# Patient Record
Sex: MALE | Race: WHITE | Employment: FULL TIME | URBAN - METROPOLITAN AREA
[De-identification: names, ages, dates, MRNs, and addresses within clinical notes are randomized per-mention and may not be internally consistent; named-entity substitution may affect disease eponyms.]

---

## 2021-06-04 ENCOUNTER — ANESTHESIA EVENT (OUTPATIENT)
Dept: SURGERY | Age: 61
End: 2021-06-04
Payer: COMMERCIAL

## 2021-06-04 RX ORDER — HYDROCHLOROTHIAZIDE 25 MG/1
25 TABLET ORAL DAILY
COMMUNITY

## 2021-06-04 RX ORDER — LANOLIN ALCOHOL/MO/W.PET/CERES
1000 CREAM (GRAM) TOPICAL DAILY
COMMUNITY

## 2021-06-04 RX ORDER — ACETAMINOPHEN 160 MG
1 TABLET,DISINTEGRATING ORAL DAILY
COMMUNITY

## 2021-06-04 RX ORDER — HUMAN IMMUNOGLOBULIN G 0.2 G/ML
LIQUID SUBCUTANEOUS WEEKLY
COMMUNITY

## 2021-06-04 RX ORDER — BUDESONIDE 3 MG/1
6 CAPSULE, COATED PELLETS ORAL EVERY MORNING
COMMUNITY

## 2021-06-04 RX ORDER — MONTELUKAST SODIUM 10 MG/1
10 TABLET ORAL NIGHTLY
COMMUNITY

## 2021-06-04 RX ORDER — AMLODIPINE BESYLATE 10 MG/1
10 TABLET ORAL DAILY
COMMUNITY

## 2021-06-04 RX ORDER — ASPIRIN 81 MG/1
81 TABLET ORAL DAILY
COMMUNITY

## 2021-06-04 RX ORDER — ATORVASTATIN CALCIUM 40 MG/1
40 TABLET, FILM COATED ORAL DAILY
COMMUNITY

## 2021-06-04 NOTE — PROGRESS NOTES
Preoperative Screening for Elective Surgery/Invasive Procedures While COVID-19 present in the community     Have you tested positive or have been told to self-isolate for COVID-19 like symptoms within the past 28 days? no   Do you currently have any of the following symptoms? no  o Fever >100.0 F or 99.9 F in immunocompromised patients? no  o New onset cough, shortness of breath or difficulty breathing? no  o New onset sore throat, myalgia (muscle aches and pains), headache, loss of taste/smell or diarrhea? no   Have you had a potential exposure to COVID-19 within the past 14 days by:  o Close contact with a confirmed case? no  o Close contact with a healthcare worker,  or essential infrastructure worker (grocery store, [employer], gas station, public utilities or transportation)? Yes md offices   o Do you reside in a congregate setting such as; skilled nursing facility, adult home, correctional facility, homeless shelter or other institutional setting? no  Have you had recent travel to a known COVID-19 hotspot? no  Indicate if the patient has a positive screen by answering yes to one or more of the above questions. Patients who test positive or screen positive prior to surgery or on the day of surgery should be evaluated in conjunction with the surgeon/proceduralist/anesthesiologist to determine the urgency of the procedure. Received COVID 19 vaccine over 14 days ago.

## 2021-06-04 NOTE — PROGRESS NOTES
[address] time___0905_________        Surgery time___1035________    Take the following medications with a sip of water:  Per Md instructions     Do not eat or drink anything after 12:00 midnight prior to your surgery. This includes water chewing gum, mints and ice chips. You may brush your teeth and gargle the morning of your surgery, but do not swallow the water     Please see your family doctor/pediatrician for a history and physical and/or concerning medications. H&P 5/21/21 YVROSE Harry    Bring any test results/reports from your physicians office. If you are under the care of a heart doctor or specialist doctor, please be aware that you may be asked to them for clearance    You may be asked to stop blood thinners such as Coumadin, Plavix, Fragmin, Lovenox, etc., or any anti-inflammatories such as:  Aspirin, Ibuprofen, Advil, Naproxen prior to your surgery. We also ask that you stop any OTC medications such as fish oil, vitamin E, glucosamine, garlic, Multivitamins, COQ 10, etc.    We ask that you do not smoke 24 hours prior to surgery  We ask that you do not  drink any alcoholic beverages 24 hours prior to surgery     You must make arrangements for a responsible adult to take you home after your surgery. For your safety you will not be allowed to leave alone or drive yourself home. Your surgery will be cancelled if you do not have a ride home. Also for your safety, it is strongly suggested that someone stay with you the first 24 hours after your surgery. A parent or legal guardian must accompany a child scheduled for surgery and plan to stay at the hospital until the child is discharged. Please do not bring other children with you. For your comfort, please wear simple loose fitting clothing to the hospital.  Please do not bring valuables. Do not wear any make-up or nail polish on your fingers or toes.  Wear short sleeve button down shirt or loose fitting shirt. Bring eye drops or antibiotic ointment. For your safety, please do not wear any jewelry or body piercing's on the day of surgery. All jewelry must be removed. If you have dentures, they will be removed before going to operating room. For your convenience, we will provide you with a container. If you wear contact lenses or glasses, they will be removed, please bring a case for them. If you have a living will and a durable power of  for healthcare, please bring in a copy. As part of our patient safety program to minimize surgical site infections, we ask you to do the following:    · Please notify your surgeon if you develop any illness between         now and the  day of your surgery. · This includes a cough, cold, fever, sore throat, nausea,         or vomiting, and diarrhea, etc.  ·  Please notify your surgeon if you experience dizziness, shortness         of breath or blurred vision between now and the time of your surgery. Do not shave your operative site 96 hours prior to surgery. For face and neck surgery, men may use an electric razor 48 hours   prior to surgery. You may shower the night before surgery or the morning of   your surgery with an antibacterial soap. You will need to bring a photo ID and insurance card    [facility] has an onsite pharmacy, would you like to utilize our pharmacy     If you will be staying overnight and use a C-pap machine, please bring   your C-pap to hospital     Our goal is to provide you with excellent care, therefore, visitors will be limited to two(2) in the room at a time so that we may focus on providing this care for you. Please contact pre-admission testing if you have any further questions.                  [facility] phone number:  [phone]    Please note these are generalized instructions for all surgical cases, you may be provided with more specific instructions according to your

## 2021-06-04 NOTE — PROGRESS NOTES
[address]        Pre-Op Phone Call:     Patient Name: La Guzman     Telephone Information:   Mobile [phone]     Home phone:  [phone]    Surgery Time:   10:30 AM     Arrival Time:  0900     Left extended Message:  NA     Message left with:     Recent change in health status:  No     Advised of transportation/ policy:  Yes     NPO policy reviewed:  Yes pt     Advised to take morning heart/blood pressure medications with sips of water morning of surgery? Yes     Instructed to bring eye drops, photo identification, and insurance card day of surgery? Yes     Advised to wear short sleeved button down shirt (no T-shirt underneath):  Yes     Advised not to wear jewelry, hairpins, or pantyhose day of surgery? Yes     Advised not to wear make-up and to wash face day of surgery?   Yes    Remarks:        Electronically signed by:  Libia Sexton RN at 6/4/2021 1:38 PM

## 2021-06-07 ENCOUNTER — HOSPITAL ENCOUNTER (OUTPATIENT)
Age: 61
Setting detail: OUTPATIENT SURGERY
Discharge: HOME | End: 2021-06-07
Attending: OPHTHALMOLOGY | Admitting: OPHTHALMOLOGY
Payer: COMMERCIAL

## 2021-06-07 ENCOUNTER — ANESTHESIA (OUTPATIENT)
Dept: SURGERY | Age: 61
End: 2021-06-07
Payer: COMMERCIAL

## 2021-06-07 VITALS
DIASTOLIC BLOOD PRESSURE: 87 MMHG | HEIGHT: 72 IN | HEART RATE: 79 BPM | RESPIRATION RATE: 16 BRPM | WEIGHT: 244 LBS | OXYGEN SATURATION: 97 % | SYSTOLIC BLOOD PRESSURE: 136 MMHG | TEMPERATURE: 97.8 F | BODY MASS INDEX: 33.05 KG/M2

## 2021-06-07 VITALS
RESPIRATION RATE: 20 BRPM | DIASTOLIC BLOOD PRESSURE: 79 MMHG | OXYGEN SATURATION: 98 % | SYSTOLIC BLOOD PRESSURE: 127 MMHG

## 2021-06-07 DIAGNOSIS — H02.011 CICATRICIAL ENTROPION OF RIGHT UPPER EYELID: ICD-10-CM

## 2021-06-07 DIAGNOSIS — H10.89 CONJUNCTIVITIS IN MUCOCUTANEOUS DISEASE: ICD-10-CM

## 2021-06-07 PROCEDURE — 2580000003 HC RX 258: Performed by: ANESTHESIOLOGY

## 2021-06-07 PROCEDURE — 3600000012 HC SURGERY LEVEL 2 ADDTL 15MIN: Performed by: OPHTHALMOLOGY

## 2021-06-07 PROCEDURE — 7100000011 HC PHASE II RECOVERY - ADDTL 15 MIN: Performed by: OPHTHALMOLOGY

## 2021-06-07 PROCEDURE — 6370000000 HC RX 637 (ALT 250 FOR IP): Performed by: OPHTHALMOLOGY

## 2021-06-07 PROCEDURE — 3600000002 HC SURGERY LEVEL 2 BASE: Performed by: OPHTHALMOLOGY

## 2021-06-07 PROCEDURE — 2500000003 HC RX 250 WO HCPCS: Performed by: OPHTHALMOLOGY

## 2021-06-07 PROCEDURE — 2709999900 HC NON-CHARGEABLE SUPPLY: Performed by: OPHTHALMOLOGY

## 2021-06-07 PROCEDURE — V2790 AMNIOTIC MEMBRANE: HCPCS | Performed by: OPHTHALMOLOGY

## 2021-06-07 PROCEDURE — 3700000000 HC ANESTHESIA ATTENDED CARE: Performed by: OPHTHALMOLOGY

## 2021-06-07 PROCEDURE — 7100000010 HC PHASE II RECOVERY - FIRST 15 MIN: Performed by: OPHTHALMOLOGY

## 2021-06-07 PROCEDURE — 6360000002 HC RX W HCPCS: Performed by: NURSE ANESTHETIST, CERTIFIED REGISTERED

## 2021-06-07 PROCEDURE — 3700000001 HC ADD 15 MINUTES (ANESTHESIA): Performed by: OPHTHALMOLOGY

## 2021-06-07 PROCEDURE — 88300 SURGICAL PATH GROSS: CPT

## 2021-06-07 DEVICE — IMPLANTABLE DEVICE: Type: IMPLANTABLE DEVICE | Site: EYE | Status: FUNCTIONAL

## 2021-06-07 RX ORDER — SODIUM CHLORIDE 0.9 % (FLUSH) 0.9 %
10 SYRINGE (ML) INJECTION EVERY 12 HOURS SCHEDULED
Status: DISCONTINUED | OUTPATIENT
Start: 2021-06-07 | End: 2021-06-07 | Stop reason: HOSPADM

## 2021-06-07 RX ORDER — FENTANYL CITRATE 50 UG/ML
INJECTION, SOLUTION INTRAMUSCULAR; INTRAVENOUS PRN
Status: DISCONTINUED | OUTPATIENT
Start: 2021-06-07 | End: 2021-06-07 | Stop reason: SDUPTHER

## 2021-06-07 RX ORDER — PROPOFOL 10 MG/ML
INJECTION, EMULSION INTRAVENOUS PRN
Status: DISCONTINUED | OUTPATIENT
Start: 2021-06-07 | End: 2021-06-07 | Stop reason: SDUPTHER

## 2021-06-07 RX ORDER — SODIUM CHLORIDE 9 MG/ML
INJECTION, SOLUTION INTRAVENOUS CONTINUOUS
Status: DISCONTINUED | OUTPATIENT
Start: 2021-06-07 | End: 2021-06-07 | Stop reason: HOSPADM

## 2021-06-07 RX ORDER — LABETALOL HYDROCHLORIDE 5 MG/ML
5 INJECTION, SOLUTION INTRAVENOUS EVERY 10 MIN PRN
Status: DISCONTINUED | OUTPATIENT
Start: 2021-06-07 | End: 2021-06-07 | Stop reason: HOSPADM

## 2021-06-07 RX ORDER — ONDANSETRON 2 MG/ML
4 INJECTION INTRAMUSCULAR; INTRAVENOUS
Status: DISCONTINUED | OUTPATIENT
Start: 2021-06-07 | End: 2021-06-07 | Stop reason: HOSPADM

## 2021-06-07 RX ORDER — SODIUM CHLORIDE 0.9 % (FLUSH) 0.9 %
10 SYRINGE (ML) INJECTION PRN
Status: DISCONTINUED | OUTPATIENT
Start: 2021-06-07 | End: 2021-06-07 | Stop reason: HOSPADM

## 2021-06-07 RX ORDER — MIDAZOLAM HYDROCHLORIDE 1 MG/ML
INJECTION INTRAMUSCULAR; INTRAVENOUS PRN
Status: DISCONTINUED | OUTPATIENT
Start: 2021-06-07 | End: 2021-06-07 | Stop reason: SDUPTHER

## 2021-06-07 RX ORDER — SODIUM CHLORIDE 9 MG/ML
25 INJECTION, SOLUTION INTRAVENOUS PRN
Status: DISCONTINUED | OUTPATIENT
Start: 2021-06-07 | End: 2021-06-07 | Stop reason: HOSPADM

## 2021-06-07 RX ORDER — PROMETHAZINE HYDROCHLORIDE 25 MG/ML
6.25 INJECTION, SOLUTION INTRAMUSCULAR; INTRAVENOUS
Status: DISCONTINUED | OUTPATIENT
Start: 2021-06-07 | End: 2021-06-07 | Stop reason: HOSPADM

## 2021-06-07 RX ORDER — TETRACAINE HYDROCHLORIDE 5 MG/ML
SOLUTION OPHTHALMIC
Status: COMPLETED | OUTPATIENT
Start: 2021-06-07 | End: 2021-06-07

## 2021-06-07 RX ADMIN — FENTANYL CITRATE 50 MCG: 50 INJECTION INTRAMUSCULAR; INTRAVENOUS at 10:40

## 2021-06-07 RX ADMIN — SODIUM CHLORIDE: 9 INJECTION, SOLUTION INTRAVENOUS at 09:01

## 2021-06-07 RX ADMIN — SODIUM CHLORIDE: 9 INJECTION, SOLUTION INTRAVENOUS at 10:35

## 2021-06-07 RX ADMIN — MIDAZOLAM 1 MG: 1 INJECTION INTRAMUSCULAR; INTRAVENOUS at 10:42

## 2021-06-07 RX ADMIN — PROPOFOL 50 MG: 10 INJECTION, EMULSION INTRAVENOUS at 10:41

## 2021-06-07 RX ADMIN — PROPOFOL 10 MG: 10 INJECTION, EMULSION INTRAVENOUS at 10:48

## 2021-06-07 RX ADMIN — PROPOFOL 50 MG: 10 INJECTION, EMULSION INTRAVENOUS at 10:42

## 2021-06-07 RX ADMIN — MIDAZOLAM 1 MG: 1 INJECTION INTRAMUSCULAR; INTRAVENOUS at 10:40

## 2021-06-07 RX ADMIN — FENTANYL CITRATE 50 MCG: 50 INJECTION INTRAMUSCULAR; INTRAVENOUS at 10:48

## 2021-06-07 ASSESSMENT — PAIN SCALES - GENERAL
PAINLEVEL_OUTOF10: 0

## 2021-06-07 NOTE — PROGRESS NOTES
Checked on patient, offered warm blanket. Patient declines at this time. Patient denies all other needs at this time. Will continue to monitor.

## 2021-06-07 NOTE — BRIEF OP NOTE
Brief Postoperative Note      Patient: Valentina Rucker  YOB: 1960  MRN: [id number]    Date of Procedure: 6/7/2021    Pre-Op Diagnosis: Conjunctivitis in mucocutaneous disease, Cicatricial entropion of right upper eyelid    Post-Op Diagnosis: Same       Procedure(s):  RIGHT EYE CONJUNCTIVAL EXPLORATION LESION EXCISION -  AMNIOTIC MEMBRANE/TISSED GLUE - WILL SEND SPECIMEN FOR IMMUNOFLOURESCENCE    Surgeon(s):  Neel Burns MD    Assistant:  * No surgical staff found *    Anesthesia: Monitor Anesthesia Care    Estimated Blood Loss (mL): Minimal    Complications: None    Specimens:   ID Type Source Tests Collected by Time Destination   A : Conjunctivitis in mucocutaneous disease, Cicatricial entropion of right upper eyelid [address] [place] [address], MD 6/7/2021 9662        Implants:  Implant Name Type Inv.  Item Serial No.  Lot No. LRB No. Used Action   GRAFT HUM TISS D EYE AMINOGRFT - [id number]  GRAFT HUM TISS D EYE AMINOGRFT [id number] [employer]  Right 1 Implanted         Drains: * No LDAs found *    Findings: Cicatricial conjunctivitis, right eye    Electronically signed by Neel Burns MD on 6/7/2021 at 11:06 AM

## 2021-06-07 NOTE — H&P
Date of Surgery Update:  Myles Leyva was seen, history and physical examination reviewed, and patient examined by me today. There have been no significant clinical changes since the completion of the previous history and physical. The surgical site was confirmed by the patient and me. The risk, benefits, and alternatives of the proposed procedure have been explained to the patient (or appropriate guardian) and understanding verbalized. All questions answered. Patient wishes to proceed.     Electronically signed by: Aye Alvarado MD,6/7/2021,9:47 AM

## 2021-06-07 NOTE — ANESTHESIA PRE PROCEDURE
[facility] Department of Anesthesiology  Pre-Anesthesia Evaluation/Consultation       Name:  Astrid Fenton  : 1960  Age:  63 y.o. MRN:  [id number]  Date: 2021           Surgeon: Surgeon(s):  Brandi Cantu MD    Procedure: Procedure(s):  RIGHT EYE CONJUNCTIVAL EXPLORATION LESION EXCISION -  AMNIOTIC MEMBRANE/TISSED GLUE - WILL SEND SPECIMEN FOR IMMUNOFLOURESCENCE     Allergies   Allergen Reactions    Ace Inhibitors      cough    Lisinopril      cough    Losartan      cough    Naproxen Hives     Flushed chest up     There is no problem list on file for this patient. Past Medical History:   Diagnosis Date    Anesthesia complication     Makes him sneeze     [facility]     Hyperlipidemia     Hypertension     IgG deficiency ([facility]     Seasonal allergies     Sleep apnea     wears cpap    Waldenstrom macroglobulinemia ([facility] 2013    chemo     Past Surgical History:   Procedure Laterality Date    APPENDECTOMY      COLON SURGERY      COLONOSCOPY      HERNIA REPAIR      X 2    KNEE SURGERY Left     NASAL SINUS SURGERY      polys removed     RHINOPLASTY       Social History     Tobacco Use    Smoking status: Current Every Day Smoker     Types: Cigars    Smokeless tobacco: Former User     Types: Chew    Tobacco comment: occ   Vaping Use    Vaping Use: Never used   Substance Use Topics    Alcohol use: Yes    Drug use: Never     Medications  No current facility-administered medications on file prior to encounter. Current Outpatient Medications on File Prior to Encounter   Medication Sig Dispense Refill    aspirin 81 MG EC tablet Take 81 mg by mouth daily      atorvastatin (LIPITOR) 40 MG tablet Take 40 mg by mouth daily      Immune Globulin, Human, (HIZENTRA) 4 GM/20ML SOLN Inject into the skin once a week Indications:         amLODIPine (NORVASC) 10 MG tablet Take 10 mg by mouth daily Indications: takes 0.5 tablet      hydroCHLOROthiazide (HYDRODIURIL) 25 MG tablet Take 25 mg by mouth daily      montelukast (SINGULAIR) 10 MG tablet Take 10 mg by mouth nightly      Ginkgo Biloba 120 MG CAPS Take 120 mg by mouth 3 times daily      vitamin B-12 (CYANOCOBALAMIN) 1000 MCG tablet Take 1,000 mcg by mouth daily      Cholecalciferol (VITAMIN D3) 50 MCG ( UT) CAPS Take 1 capsule by mouth daily      budesonide (ENTOCORT EC) 3 MG extended release capsule Take 6 mg by mouth every morning       Current Facility-Administered Medications   Medication Dose Route Frequency Provider Last Rate Last Admin    0.9 % sodium chloride infusion   Intravenous Continuous Guido Brady  mL/hr at 21 09 New Bag at 21 09    sodium chloride flush 0.9 % injection 10 mL  10 mL Intravenous 2 times per day Guido Brady MD        sodium chloride flush 0.9 % injection 10 mL  10 mL Intravenous PRN Guido Brady MD        0.9 % sodium chloride infusion  25 mL Intravenous PRN Guido Brady MD         Vital Signs (Current)   Vitals:    21 0845   BP: (!) 140/85   Pulse: 60   Resp: 20   Temp: 96.6 °F (35.9 °C)   SpO2: 99%     Vital Signs Statistics (for past 48 hrs)     Temp  Av.6 °F (35.9 °C)  Min: 96.6 °F (35.9 °C)   Min taken time: 2145  Max: 96.6 °F (35.9 °C)   Max taken time: 21 0845  Pulse  Av  Min: 61   Min taken time: 21 0845  Max: 61   Max taken time: 21 0845  Resp  Av  Min: 21   Min taken time: 21 0845  Max: 21   Max taken time: 21 0845  BP  Min: 140/85   Min taken time: 2145  Max: 140/85   Max taken time: 21 0845  SpO2  Av %  Min: 99 %   Min taken time: 21 0845  Max: 99 %   Max taken time: 21 0845    BP Readings from Last 3 Encounters:   21 (!) 140/85     BMI  Body mass index is 33.09 kg/m². Estimated body mass index is 33.09 kg/m² as calculated from the following:    Height as of this encounter: 6' (1.829 m).     Weight as of this interval changes to history and physical examination. Anesthetic plan, risks, benefits, alternatives, and personnel involved discussed with patient. Questions and concerns addressed. Patient(family) verbalized an understanding and agrees to proceed.       Katie Hager MD  June 7, 2021  9:06 AM

## 2021-06-18 NOTE — OP NOTE
Operative Note      Patient: Ngoc Pulido  YOB: 1960  MRN: [id number]    Date of Procedure: 6/7/2021    Pre-Op Diagnosis: Conjunctivitis in mucocutaneous disease, Cicatricial entropion of right upper eyelid    Post-Op Diagnosis: Cicatrizing conjunctivitis and cicatricial entropion, right upper lid. Procedure(s):  RIGHT EYE CONJUNCTIVAL EXPLORATION LESION EXCISION -  AMNIOTIC MEMBRANE/TISSED GLUE - WILL SEND SPECIMEN FOR IMMUNOFLOURESCENCE    Surgeon(s):  Panchito Winkler MD    Assistant:   * No surgical staff found *    Anesthesia: Monitor Anesthesia Care    Estimated Blood Loss (mL): Minimal    Complications: None    Specimens:   ID Type Source Tests Collected by Time Destination   A : Conjunctivitis in mucocutaneous disease, Cicatricial entropion of right upper eyelid [address] [place] [address] MD Caity 6/7/2021 1318        Implants:  Implant Name Type Inv. Item Serial No.  Lot No. LRB No. Used Action   GRAFT HUM TISS D EYE AMINOGRFT - [id number]  GRAFT HUM TISS D EYE AMINOGRFT [id number] [employer]  Right 1 Implanted         Drains: * No LDAs found *    Findings: Cicatrix right upper lid tarsal conjunctiva    Detailed Description of Procedure: The patient was greeted in the preoperative area, where the correct site was identified and marked. The patient was brought into the operating room and placed under monitored anesthesia care. A time-out for safety was held. Local anesthetic was injected in the right upper lid. The patient was then prepped and draped in the usual sterile fashion. The lid was everted. A cicatrix was present across the entire length of the tarsal conjunctiva, causing cicatricial entropion. It was incised with a #11 blade and the cicatrix was carefully excised with a Jenners blade. The specimen was placed in the apropriate media to be sent to the lab for Immunofluorescence studies.  Next, Meghan Jernigan was cut to size to

## (undated) DEVICE — SUTURE PERMAHAND SZ 4-0 L18IN NONABSORBABLE BLK L19MM FS-2 683G

## (undated) DEVICE — SOLUTION IV IRRIG 250ML ST LF 0.9% SODIUM 2F7122

## (undated) DEVICE — SUTURE PERMAHAND SZ 4-0 L18IN NONABSORBABLE BLK L13MM G-3 789G

## (undated) DEVICE — ENT I-LF: Brand: MEDLINE INDUSTRIES, INC.

## (undated) DEVICE — BLADE 9563091 MIDLINE LEFT 11

## (undated) DEVICE — APPLICATOR,COTTON-TIP,WOOD,3,STRL: Brand: MEDLINE

## (undated) DEVICE — 6 ML SYRINGE LUER-LOCK TIP: Brand: MONOJECT

## (undated) DEVICE — INTENDED FOR TISSUE SEPARATION, AND OTHER PROCEDURES THAT REQUIRE A SHARP SURGICAL BLADE TO PUNCTURE OR CUT.: Brand: BARD-PARKER ® STAINLESS STEEL BLADES

## (undated) DEVICE — SATINCRESCENT® KNIFE ANGLED BEVEL UP: Brand: SATINCRESCENT®

## (undated) DEVICE — GLOVE SURG SZ 65 CRM LTX FREE POLYISOPRENE POLYMER BEAD ANTI

## (undated) DEVICE — LENS CONTACT FLX AIR OPTIX NT

## (undated) DEVICE — SYRINGE, LUER LOCK, 3ML: Brand: MEDLINE

## (undated) DEVICE — Z DISCONTINUED USE 2131664 WIPE INSTR W3XL3IN NONLINTING

## (undated) DEVICE — GAUZE SPONGES,12 PLY: Brand: CURITY

## (undated) DEVICE — COVER LT HNDL BLU PLAS

## (undated) DEVICE — SOLUTION IRRIG 250ML STRL H2O PLAS POUR BTL USP

## (undated) DEVICE — NEEDLE HYPO 30GA L0.5IN BGE POLYPR HUB S STL REG BVL STR

## (undated) DEVICE — NEEDLE HYPO 26GA L0.5IN TAN POLYPR HUB S STL REG BVL STR